# Patient Record
Sex: MALE | Race: WHITE | NOT HISPANIC OR LATINO | Employment: FULL TIME | URBAN - METROPOLITAN AREA
[De-identification: names, ages, dates, MRNs, and addresses within clinical notes are randomized per-mention and may not be internally consistent; named-entity substitution may affect disease eponyms.]

---

## 2020-11-21 ENCOUNTER — OFFICE VISIT (OUTPATIENT)
Dept: URGENT CARE | Facility: CLINIC | Age: 25
End: 2020-11-21

## 2020-11-21 VITALS
HEIGHT: 65 IN | SYSTOLIC BLOOD PRESSURE: 110 MMHG | HEART RATE: 102 BPM | DIASTOLIC BLOOD PRESSURE: 70 MMHG | BODY MASS INDEX: 25.26 KG/M2 | WEIGHT: 151.6 LBS | OXYGEN SATURATION: 96 % | TEMPERATURE: 98 F | RESPIRATION RATE: 14 BRPM

## 2020-11-21 DIAGNOSIS — H10.31 ACUTE BACTERIAL CONJUNCTIVITIS OF RIGHT EYE: ICD-10-CM

## 2020-11-21 PROCEDURE — 99204 OFFICE O/P NEW MOD 45 MIN: CPT | Performed by: PHYSICIAN ASSISTANT

## 2020-11-21 RX ORDER — POLYMYXIN B SULFATE AND TRIMETHOPRIM 1; 10000 MG/ML; [USP'U]/ML
1 SOLUTION OPHTHALMIC EVERY 4 HOURS
Qty: 10 ML | Refills: 0 | Status: SHIPPED | OUTPATIENT
Start: 2020-11-21 | End: 2020-11-28

## 2020-11-21 ASSESSMENT — ENCOUNTER SYMPTOMS
FEVER: 0
DOUBLE VISION: 0
COUGH: 0
NAUSEA: 0
EYE PAIN: 0
VOMITING: 0
HEADACHES: 0
SORE THROAT: 0
SHORTNESS OF BREATH: 0
EYE ITCHING: 1
BLURRED VISION: 0
FOREIGN BODY SENSATION: 0
EYE DISCHARGE: 1
EYE REDNESS: 1

## 2020-11-21 ASSESSMENT — VISUAL ACUITY: OU: 1

## 2020-11-21 NOTE — PROGRESS NOTES
"Subjective:      Tono Patel is a 25 y.o. male who presents with Eye Problem (Conjunctivitis in right eye x today. )            Eye Problem   The right eye is affected. This is a new problem. The current episode started today. The problem occurs constantly. The problem has been unchanged. There was no injury mechanism. The pain is mild. He does not wear contacts. Associated symptoms include an eye discharge, eye redness and itching. Pertinent negatives include no blurred vision, double vision, fever, foreign body sensation, nausea, recent URI or vomiting. He has tried nothing for the symptoms.     PMH:  has no past medical history on file.  MEDS:   Current Outpatient Medications:   •  Esomeprazole Magnesium (NEXIUM 24HR PO), Take 1 Capsule by mouth every day., Disp: , Rfl:   ALLERGIES: No Known Allergies  SURGHX: No past surgical history on file.  SOCHX:  reports that he has been smoking. He has never used smokeless tobacco.  FH: Family history was reviewed, no pertinent findings to report    Review of Systems   Constitutional: Negative for fever.   HENT: Negative for congestion, ear pain and sore throat.    Eyes: Positive for discharge, redness and itching. Negative for blurred vision, double vision and pain.   Respiratory: Negative for cough and shortness of breath.    Cardiovascular: Negative for chest pain and leg swelling.   Gastrointestinal: Negative for nausea and vomiting.   Musculoskeletal: Negative for joint pain.   Skin: Negative for rash.   Neurological: Negative for headaches.   All other systems reviewed and are negative.         Objective:     /70 (BP Location: Left arm, Patient Position: Sitting, BP Cuff Size: Adult)   Pulse (!) 102   Temp 36.7 °C (98 °F) (Temporal)   Resp 14   Ht 1.651 m (5' 5\")   Wt 68.8 kg (151 lb 9.6 oz)   SpO2 96%   BMI 25.23 kg/m²      Physical Exam  Constitutional:       General: He is not in acute distress.     Appearance: Normal appearance. He is well-developed. " He is not ill-appearing.   HENT:      Head: Normocephalic and atraumatic.      Right Ear: External ear normal.      Left Ear: External ear normal.      Nose: Nose normal.   Eyes:      General: Lids are normal. Vision grossly intact.         Right eye: Discharge (purulent discharge to the patient's right eye) present.      Extraocular Movements: Extraocular movements intact.      Conjunctiva/sclera:      Right eye: Right conjunctiva is injected.      Pupils: Pupils are equal, round, and reactive to light.   Neck:      Musculoskeletal: Normal range of motion and neck supple.   Cardiovascular:      Rate and Rhythm: Normal rate.   Pulmonary:      Effort: Pulmonary effort is normal.   Skin:     General: Skin is warm and dry.   Neurological:      Mental Status: He is alert and oriented to person, place, and time.                 Assessment/Plan:        1. Acute bacterial conjunctivitis of right eye  - polymixin-trimethoprim (POLYTRIM) 83614-1.1 UNIT/ML-% Solution; Administer 1 Drop into the right eye every 4 hours for 7 days.  Dispense: 10 mL; Refill: 0    Differential diagnoses, supportive care, and indications for immediate follow-up discussed with patient.   Instructed to return to clinic or nearest emergency department for any change in condition, further concerns, or worsening of symptoms.    OTC Tylenol or Motrin for fever/discomfort.  Avoid touching eyes  Hand Hygiene   Follow-up with PCP  Return to clinic or go to the ED if symptoms worsen or fail to improve, or if patient should develop worsening/increasing/persistent eye redness, eye drainage, eye pain, eye itchiness, vision changes, periorbital redness or swelling, headache, fever/chills, and/or any concerning symptoms.    Discussed plan with the patient, and he agrees to the above.    Please note that this dictation was created using voice recognition software. I have made every reasonable attempt to correct obvious errors, but I expect that there may be  errors of grammar and possibly content that I did not discover before finalizing the note.